# Patient Record
Sex: MALE | NOT HISPANIC OR LATINO
[De-identification: names, ages, dates, MRNs, and addresses within clinical notes are randomized per-mention and may not be internally consistent; named-entity substitution may affect disease eponyms.]

---

## 2024-06-13 PROBLEM — F64.0 GENDER DYSPHORIA IN ADULT: Status: ACTIVE | Noted: 2024-06-13

## 2024-06-13 PROBLEM — Z78.9 NON-SMOKER: Status: ACTIVE | Noted: 2024-06-13

## 2024-06-13 PROBLEM — Z00.00 ENCOUNTER FOR PREVENTIVE HEALTH EXAMINATION: Status: ACTIVE | Noted: 2024-06-13

## 2024-06-13 RX ORDER — TESTOSTERONE 30 MG/1.5ML
SOLUTION TOPICAL
Refills: 0 | Status: ACTIVE | COMMUNITY

## 2024-06-20 ENCOUNTER — APPOINTMENT (OUTPATIENT)
Dept: PLASTIC SURGERY | Facility: CLINIC | Age: 29
End: 2024-06-20
Payer: COMMERCIAL

## 2024-06-20 VITALS
SYSTOLIC BLOOD PRESSURE: 112 MMHG | WEIGHT: 112.8 LBS | HEART RATE: 68 BPM | DIASTOLIC BLOOD PRESSURE: 71 MMHG | HEIGHT: 61 IN | OXYGEN SATURATION: 99 % | BODY MASS INDEX: 21.3 KG/M2

## 2024-06-20 DIAGNOSIS — F64.0 TRANSSEXUALISM: ICD-10-CM

## 2024-06-20 DIAGNOSIS — Z78.9 OTHER SPECIFIED HEALTH STATUS: ICD-10-CM

## 2024-06-20 PROCEDURE — 99204 OFFICE O/P NEW MOD 45 MIN: CPT

## 2024-06-20 NOTE — ASSESSMENT
[FreeTextEntry1] : The patient is specifically interested in a double incision top surgery technique with free nipple grafting. They express understanding of the risks associated with surgery as listed above. To proceed, they will need 1 mental health letter of assessment.  I, Dr. Najera, personally performed the evaluation and management (E/M) services for this new patient. That E/M includes conducting the clinically appropriate initial history &/or exam, assessing all conditions, and establishing the plan of care. Today, my ZARIA, Cullen Ramirez PA-C, was here to observe my evaluation and management service for this patient & follow plan of care established by me going forward.

## 2024-06-20 NOTE — HISTORY OF PRESENT ILLNESS
[FreeTextEntry1] : 29yo transgender man designated female at birth (Rito; they/them) presents for consultation for top surgery. States he wants his chest "flat and masc". Patient has been on T for 1.5 years. Patient does not currently bind. Denies any recent lumps/bumps, or other breast changes. No history of breast imaging. Denies any family history of breast, ovarian, or pancreatic cancer. Denies any family history of DVT/clots. Denies any personal or family history of issues with general anesthesia. States that nipple sensation is "not that important" (2/5 of importance). No plans to ever breast feed and expresses understanding that this procedure would render the patient unable to do so.  Patient works as a []. Lives with alone but notes friends and family will be supportive after surgery. Denies tobacco use, drinks some alcohol, and denies any other recreational drug use. Patient denies any history of psychiatric hospitalization or ER admission.

## 2024-06-20 NOTE — PHYSICAL EXAM
[de-identified] : NAD. BMI 21.3 [de-identified] : Normal respiratory effort. [de-identified] : Breast exam was performed with a medical chaperone present (DM). No dominant masses, skin changes, nipple retraction, or palpable axillary lymphadenopathy. SN->N distance is 18.5 cm on the left and 19 cm on the right; width is 14 cm on the left and 14 cm on the right; N->IMF is 6 cm on the left and 6 cm on the right. Chest circumference is approximately 79 cm.